# Patient Record
Sex: FEMALE | Race: BLACK OR AFRICAN AMERICAN | NOT HISPANIC OR LATINO | Employment: FULL TIME | URBAN - METROPOLITAN AREA
[De-identification: names, ages, dates, MRNs, and addresses within clinical notes are randomized per-mention and may not be internally consistent; named-entity substitution may affect disease eponyms.]

---

## 2017-07-12 ENCOUNTER — TRANSCRIBE ORDERS (OUTPATIENT)
Dept: ADMINISTRATIVE | Facility: HOSPITAL | Age: 42
End: 2017-07-12

## 2017-07-12 DIAGNOSIS — Q50.6: Primary | ICD-10-CM

## 2017-07-20 ENCOUNTER — HOSPITAL ENCOUNTER (OUTPATIENT)
Dept: GENERAL RADIOLOGY | Facility: HOSPITAL | Age: 42
Discharge: HOME | End: 2017-07-20
Attending: OBSTETRICS & GYNECOLOGY | Admitting: OBSTETRICS & GYNECOLOGY

## 2017-07-20 DIAGNOSIS — Q50.6: ICD-10-CM

## 2017-07-20 PROCEDURE — 74740 X-RAY FEMALE GENITAL TRACT: CPT

## 2017-07-20 PROCEDURE — 0 IOPAMIDOL 61 % SOLUTION: Performed by: RADIOLOGY

## 2017-07-20 RX ADMIN — IOPAMIDOL 20 ML: 612 INJECTION, SOLUTION INTRAVENOUS at 13:00

## 2017-10-19 ENCOUNTER — APPOINTMENT (OUTPATIENT)
Dept: WOMENS IMAGING | Facility: HOSPITAL | Age: 42
End: 2017-10-19

## 2017-10-19 PROCEDURE — 77067 SCR MAMMO BI INCL CAD: CPT | Performed by: RADIOLOGY

## 2019-04-12 ENCOUNTER — APPOINTMENT (OUTPATIENT)
Dept: WOMENS IMAGING | Facility: HOSPITAL | Age: 44
End: 2019-04-12

## 2019-04-12 PROCEDURE — 77067 SCR MAMMO BI INCL CAD: CPT | Performed by: RADIOLOGY

## 2021-11-11 ENCOUNTER — APPOINTMENT (OUTPATIENT)
Dept: WOMENS IMAGING | Facility: HOSPITAL | Age: 46
End: 2021-11-11

## 2021-11-11 PROCEDURE — 77063 BREAST TOMOSYNTHESIS BI: CPT | Performed by: RADIOLOGY

## 2021-11-11 PROCEDURE — 77067 SCR MAMMO BI INCL CAD: CPT | Performed by: RADIOLOGY

## 2022-08-22 ENCOUNTER — OFFICE VISIT (OUTPATIENT)
Dept: FAMILY MEDICINE CLINIC | Facility: CLINIC | Age: 47
End: 2022-08-22

## 2022-08-22 VITALS
SYSTOLIC BLOOD PRESSURE: 142 MMHG | TEMPERATURE: 98.3 F | DIASTOLIC BLOOD PRESSURE: 84 MMHG | HEIGHT: 63 IN | BODY MASS INDEX: 27.82 KG/M2 | WEIGHT: 157 LBS | OXYGEN SATURATION: 96 % | RESPIRATION RATE: 16 BRPM | HEART RATE: 86 BPM

## 2022-08-22 DIAGNOSIS — Z76.89 ENCOUNTER TO ESTABLISH CARE: Primary | ICD-10-CM

## 2022-08-22 DIAGNOSIS — M89.8X1 PAIN OF LEFT SCAPULA: ICD-10-CM

## 2022-08-22 DIAGNOSIS — R03.0 ELEVATED BLOOD-PRESSURE READING, WITHOUT DIAGNOSIS OF HYPERTENSION: ICD-10-CM

## 2022-08-22 DIAGNOSIS — M79.602 LEFT ARM PAIN: ICD-10-CM

## 2022-08-22 PROCEDURE — 93000 ELECTROCARDIOGRAM COMPLETE: CPT | Performed by: NURSE PRACTITIONER

## 2022-08-22 PROCEDURE — 99213 OFFICE O/P EST LOW 20 MIN: CPT | Performed by: NURSE PRACTITIONER

## 2022-08-22 RX ORDER — RIBOFLAVIN (VITAMIN B2) 100 MG
100 TABLET ORAL DAILY
COMMUNITY

## 2022-08-22 RX ORDER — ASPIRIN 81 MG/1
81 TABLET ORAL DAILY
COMMUNITY

## 2022-08-22 RX ORDER — METHYLPREDNISOLONE 4 MG/1
TABLET ORAL
Qty: 21 EACH | Refills: 0 | Status: SHIPPED | OUTPATIENT
Start: 2022-08-22 | End: 2022-09-15

## 2022-08-22 RX ORDER — CHLORAL HYDRATE 500 MG
CAPSULE ORAL
COMMUNITY

## 2022-08-22 RX ORDER — MELATONIN
1000 DAILY
COMMUNITY

## 2022-08-22 NOTE — PROGRESS NOTES
Patient ID: Michela Carty is a 47 y.o. female     Patient Care Team:  Head, NANCY Plascencia as PCP - General (Nurse Practitioner)  Pina Greene MD as Consulting Physician (Obstetrics and Gynecology)    Subjective     Chief Complaint   Patient presents with   • Establish Care   • Pain     Left arm and wrist    • Hypertension       History of Present Illness    Michela Carty presents to [facility] today to establish care with our practice.    Left scapula pain for one week.  No known injury.  Left arm pain, tingling and numbness for about one month.   Elevated B/P 140/93.    Initially started noticing elevated B/P about 3 years ago. Staying around 140/90 or less.    Left upper back pain at 6 on 0-10 scale.  Home remedies:  Heat, ice, and ibuprofen with temporary relief.   Works at a computer.  Right handed.      She denies any complaints of fever, chills, cough, chest pain, heart palpitations, shortness of air, abdominal pain, nausea, or any other concerns.     The following portions of the patient's history were reviewed and updated as appropriate: allergies, current medications, past family history, past medical history, past social history, past surgical history and problem list.       ROS    Vitals:    08/22/22 1358   BP: 142/84   Pulse: 86   Resp: 16   Temp: 98.3 °F (36.8 °C)   SpO2: 96%       Documented weights    08/22/22 1358   Weight: 71.2 kg (157 lb)     Body mass index is 27.81 kg/m².    Results for orders placed or performed during the hospital encounter of 02/12/21   POC Urinalysis Dipstick, Multipro (Automated dipstick)    Specimen: Urine   Result Value Ref Range    Color Yellow Yellow, Straw, Dark Yellow, Flory    Clarity, UA Cloudy (A) Clear    Glucose, UA Negative Negative, 1000 mg/dL (3+) mg/dL    Bilirubin Negative Negative    Ketones, UA Negative Negative    Specific Gravity  1.010 1.005 - 1.030    Blood, UA Moderate (A) Negative    pH, Urine 6.5 5.0 - 8.0     Protein, POC 30 mg/dL (A) Negative mg/dL    Urobilinogen, UA Normal Normal    Nitrite, UA Negative Negative    Leukocytes Large (3+) (A) Negative           Objective     Physical Exam  Vitals reviewed.   Constitutional:       General: She is not in acute distress.     Appearance: She is well-developed.   HENT:      Head: Normocephalic and atraumatic.      Right Ear: Tympanic membrane normal.      Left Ear: Tympanic membrane normal.   Eyes:      Pupils: Pupils are equal, round, and reactive to light.   Cardiovascular:      Rate and Rhythm: Normal rate and regular rhythm.      Heart sounds: Normal heart sounds. No murmur heard.  Pulmonary:      Effort: Pulmonary effort is normal.      Breath sounds: Normal breath sounds. No wheezing, rhonchi or rales.   Abdominal:      General: Bowel sounds are normal.      Palpations: Abdomen is soft.      Tenderness: There is no abdominal tenderness.   Musculoskeletal:         General: No tenderness. Normal range of motion.      Right shoulder: Normal.      Left shoulder: Normal.      Right wrist: No swelling, deformity or tenderness. Normal range of motion. Normal pulse.      Left wrist: No swelling, deformity or tenderness. Normal range of motion. Normal pulse.      Cervical back: Normal range of motion and neck supple.      Comments: Decreased strength in left hand.    Negative Phalen test.  Left wrist sleeve in place.   Tenderness to left scapula area.      Skin:     General: Skin is warm and dry.      Findings: No erythema or rash.   Neurological:      Mental Status: She is alert and oriented to person, place, and time.   Psychiatric:         Behavior: Behavior normal.         ECG 12 Lead    Date/Time: 8/22/2022 2:31 PM  Performed by: Delisa Brooks APRN  Authorized by: Delisa Brooks APRN   Previous ECG: no previous ECG available  Rhythm: sinus rhythm  Rate: normal  Conduction: conduction normal  ST Segments: ST segments normal  T Waves: T waves normal  QRS axis: normal  Other: no  other findings    Clinical impression: normal ECG             Assessment & Plan     Assessment/Plan     Diagnoses and all orders for this visit:    1. Encounter to establish care (Primary)  -     CBC (No Diff); Future  -     Comprehensive Metabolic Panel; Future  -     Lipid Panel With / Chol / HDL Ratio; Future  -     TSH Rfx On Abnormal To Free T4; Future    2. Left arm pain  -     ECG 12 Lead    3. Pain of left scapula  -     methylPREDNISolone (MEDROL) 4 MG dose pack; Take as directed on package instructions.  Dispense: 21 each; Refill: 0   Continue ibuprofen as needed.   Heating pad.    4. Elevated blood-pressure reading, without diagnosis of hypertension   Continue to monitor B/P   Let us know if stays > 140/90.   Limit salt intake.  Drink plenty of fluids.      Summary:  Michela Carty presents to office to establish care with our practice.  Main concern is left scapula and left arm pain.  EKG stable.  Will treat with medrol dose pack for now to see if helps.  Schedule for fasting lab appointment.  Results will determine further recommendations.      In the meantime, instructed to contact us sooner for any problems or concerns.    Follow Up:  Return for Schedule for fasting lab appointment and will call.  .    Patient was given instructions and counseling regarding condition or for health maintenance advice.  Please see specific information pulled into the AVS if appropriate.      Patient was wearing facemask when I entered the room and throughout our encounter. Protective equipment was worn throughout this patient encounter including a face mask.  Hand hygiene was performed before donning protective equipment and after removal when leaving the room.     Delisa Brooks, APRN  Family Medicine  [facility] [place]  08/22/22  14:40 EDT

## 2022-08-31 ENCOUNTER — TELEPHONE (OUTPATIENT)
Dept: FAMILY MEDICINE CLINIC | Facility: CLINIC | Age: 47
End: 2022-08-31

## 2022-08-31 NOTE — TELEPHONE ENCOUNTER
Caller: Michela Carty    Relationship: Self    Best call back number: [phone]      What is the best time to reach you: ANY TIME    Who are you requesting to speak with (clinical staff, provider,  specific staff member): MARIA LUISA GUTIÉRREZ      What was the call regarding: PATIENT WAS CALLING IN ABOUT HER TEST RESULTS SHE HAS SOME QUESTIONS AND CONCERNS SHE STILL FEELS THE DISCOMFORT AND DOES NOT HAVE A CARDIOLOGIST APPOINTMENT UNTIL October AND WANTS TO KNOW WHAT ELSE SHE SHOULD BE DOING    Do you require a callback: YES

## 2022-09-15 ENCOUNTER — TELEMEDICINE (OUTPATIENT)
Dept: FAMILY MEDICINE CLINIC | Facility: CLINIC | Age: 47
End: 2022-09-15

## 2022-09-15 DIAGNOSIS — M54.2 NECK PAIN: Primary | ICD-10-CM

## 2022-09-15 DIAGNOSIS — M25.512 ACUTE PAIN OF LEFT SHOULDER: ICD-10-CM

## 2022-09-15 DIAGNOSIS — R20.0 NUMBNESS AND TINGLING IN LEFT ARM: ICD-10-CM

## 2022-09-15 DIAGNOSIS — M79.632 LEFT FOREARM PAIN: ICD-10-CM

## 2022-09-15 DIAGNOSIS — R20.2 NUMBNESS AND TINGLING IN LEFT ARM: ICD-10-CM

## 2022-09-15 PROCEDURE — 99213 OFFICE O/P EST LOW 20 MIN: CPT | Performed by: NURSE PRACTITIONER

## 2022-09-15 RX ORDER — MELOXICAM 15 MG/1
15 TABLET ORAL DAILY
Qty: 30 TABLET | Refills: 2 | Status: SHIPPED | OUTPATIENT
Start: 2022-09-15

## 2022-09-15 RX ORDER — CYCLOBENZAPRINE HCL 10 MG
10 TABLET ORAL EVERY 8 HOURS PRN
Qty: 30 TABLET | Refills: 0 | Status: SHIPPED | OUTPATIENT
Start: 2022-09-15

## 2022-09-15 NOTE — PROGRESS NOTES
Michela Carty is a 47 y.o. who presents today for an E-visit with complaints of continued left arm pain and numbness.      You have chosen to receive care through a telehealth visit.  Do you consent to use a video/audio connection for your medical care today? Yes    Michela Carty was located at their residence.  NANCY Gomez was located at [facility] office    Participants:  Patient and provider    Start time:  1424   End time:  1434  Time spent caring for the patient was 5 - 10 min.    Patient ID: Michela Carty is a 47 y.o. female     Patient Care Team:  Delisa Brooks APRN as PCP - General (Nurse Practitioner)  Pina Greene MD as Consulting Physician (Obstetrics and Gynecology)    Subjective     Chief Complaint   Patient presents with   • Neck Pain   • Numbness       History of Present Illness    Michela Carty presents to [facility] today for continued problems with left arm pain and numbness.   Seen in office as a new patient on 8/22/22 due to left scapula pain and left arm pain, tingling, and numbness.      Left arm pain and numbness.  Left 5th digit stays numb and also will occur in left 4th digit.    Intermittent neck pain.  Tension in shoulders.  No pain with ROM of left shoulder and left arm except will have numbness to palm of left hand when reaching overhead.  Has pain in left forearm when resting on object such as car door.    Noticed improvement of left scapula pain with medrol dose pack however all other symptoms continue.    No known injury.    Has been taking ibuprofen 800 mg bid without improvement.       She denies any complaints of fever, chills, cough, chest pain, shortness of air, abdominal pain, nausea, or any other concerns.     The following portions of the patient's history were reviewed and updated as appropriate: allergies, current medications, past family history, past medical history, past social history, past surgical history and  problem list.       ROS    There were no vitals filed for this visit.    There were no vitals filed for this visit.  There is no height or weight on file to calculate BMI.    Results for orders placed or performed in visit on 08/23/22   CBC (No Diff)    Specimen: Blood   Result Value Ref Range    WBC 10.3 3.4 - 10.8 x10E3/uL    RBC 4.65 3.77 - 5.28 x10E6/uL    Hemoglobin 11.1 11.1 - 15.9 g/dL    Hematocrit 37.1 34.0 - 46.6 %    MCV 80 79 - 97 fL    MCH 23.9 (L) 26.6 - 33.0 pg    MCHC 29.9 (L) 31.5 - 35.7 g/dL    RDW 17.0 (H) 11.7 - 15.4 %    Platelets 316 150 - 450 x10E3/uL   Comprehensive Metabolic Panel    Specimen: Blood   Result Value Ref Range    Glucose 83 65 - 99 mg/dL    BUN 10 6 - 24 mg/dL    Creatinine 0.97 0.57 - 1.00 mg/dL    EGFR Result 73 >59 mL/min/1.73    BUN/Creatinine Ratio 10 9 - 23    Sodium 140 134 - 144 mmol/L    Potassium 4.3 3.5 - 5.2 mmol/L    Chloride 100 96 - 106 mmol/L    Total CO2 25 20 - 29 mmol/L    Calcium 9.5 8.7 - 10.2 mg/dL    Total Protein 7.1 6.0 - 8.5 g/dL    Albumin 4.6 3.8 - 4.8 g/dL    Globulin 2.5 1.5 - 4.5 g/dL    A/G Ratio 1.8 1.2 - 2.2    Total Bilirubin 0.4 0.0 - 1.2 mg/dL    Alkaline Phosphatase 59 44 - 121 IU/L    AST (SGOT) 24 0 - 40 IU/L    ALT (SGPT) 19 0 - 32 IU/L   Lipid Panel With / Chol / HDL Ratio    Specimen: Blood   Result Value Ref Range    Total Cholesterol 184 100 - 199 mg/dL    Triglycerides 84 0 - 149 mg/dL    HDL Cholesterol 51 >39 mg/dL    VLDL Cholesterol Philip 15 5 - 40 mg/dL    LDL Chol Calc (NIH) 118 (H) 0 - 99 mg/dL    Chol/HDL Ratio 3.6 0.0 - 4.4 ratio   TSH Rfx On Abnormal To Free T4    Specimen: Blood   Result Value Ref Range    TSH 3.700 0.450 - 4.500 uIU/mL   Iron Profile   Result Value Ref Range    TIBC 490 (H) 250 - 450 ug/dL    UIBC 452 (H) 131 - 425 ug/dL    Iron 38 27 - 159 ug/dL    Iron Saturation 8 (L) 15 - 55 %   Please Note   Result Value Ref Range    Please note Comment    Written Authorization   Result Value Ref Range    Written  Authorization Comment            Objective     Physical Exam  Constitutional:       General: She is not in acute distress.  Musculoskeletal:      Comments: Exam limited due to video visit.      Self directed exam.  C/O numbness in left hand when reaching overhead with left arm.   Patient points to numbness in left 5th digit.      Neurological:      Mental Status: She is alert and oriented to person, place, and time.            Assessment & Plan     Assessment/Plan     Diagnoses and all orders for this visit:    1. Neck pain (Primary)  -     meloxicam (MOBIC) 15 MG tablet; Take 1 tablet by mouth Daily.  Dispense: 30 tablet; Refill: 2  -     cyclobenzaprine (FLEXERIL) 10 MG tablet; Take 1 tablet by mouth Every 8 (Eight) Hours As Needed for Muscle Spasms.  Dispense: 30 tablet; Refill: 0  -     XR Spine Cervical 2 or 3 View; Future    2. Numbness and tingling in left arm  -     meloxicam (MOBIC) 15 MG tablet; Take 1 tablet by mouth Daily.  Dispense: 30 tablet; Refill: 2  -     XR Spine Cervical 2 or 3 View; Future  -     XR Shoulder 2+ View Left; Future  -     XR forearm 2 vw left; Future    3. Acute pain of left shoulder  -     XR Shoulder 2+ View Left; Future  -     XR forearm 2 vw left; Future    4. Left forearm pain  -     XR forearm 2 vw left; Future      Xray results will determine further recommendations.      Follow Up:  Return if symptoms worsen or fail to improve.    Patient was given instructions and counseling regarding condition or for health maintenance advice.  Please see specific information pulled into the AVS if appropriate.      Patient was wearing facemask when I entered the room and throughout our encounter. Protective equipment was worn throughout this patient encounter including a face mask.  Hand hygiene was performed before donning protective equipment and after removal when leaving the room.     Delisa Brooks, APRN  Family Medicine  [facility] [place]  09/15/22  14:37 EDT

## 2022-09-16 ENCOUNTER — HOSPITAL ENCOUNTER (OUTPATIENT)
Dept: GENERAL RADIOLOGY | Facility: HOSPITAL | Age: 47
Discharge: HOME | End: 2022-09-16

## 2022-09-16 DIAGNOSIS — M25.512 ACUTE PAIN OF LEFT SHOULDER: ICD-10-CM

## 2022-09-16 DIAGNOSIS — R20.2 NUMBNESS AND TINGLING IN LEFT ARM: ICD-10-CM

## 2022-09-16 DIAGNOSIS — R20.0 NUMBNESS AND TINGLING IN LEFT ARM: ICD-10-CM

## 2022-09-16 DIAGNOSIS — M79.632 LEFT FOREARM PAIN: ICD-10-CM

## 2022-09-16 DIAGNOSIS — M54.2 NECK PAIN: ICD-10-CM

## 2022-09-16 PROCEDURE — 73090 X-RAY EXAM OF FOREARM: CPT

## 2022-09-16 PROCEDURE — 72040 X-RAY EXAM NECK SPINE 2-3 VW: CPT

## 2022-09-16 PROCEDURE — 73030 X-RAY EXAM OF SHOULDER: CPT

## 2022-09-19 DIAGNOSIS — M54.2 NECK PAIN: Primary | ICD-10-CM

## 2022-09-19 DIAGNOSIS — R20.2 NUMBNESS AND TINGLING IN LEFT ARM: ICD-10-CM

## 2022-09-19 DIAGNOSIS — M50.30 DEGENERATIVE DISC DISEASE, CERVICAL: ICD-10-CM

## 2022-09-19 DIAGNOSIS — R20.0 NUMBNESS AND TINGLING IN LEFT ARM: ICD-10-CM

## 2022-09-27 ENCOUNTER — TREATMENT (OUTPATIENT)
Dept: PHYSICAL THERAPY | Facility: CLINIC | Age: 47
End: 2022-09-27

## 2022-09-27 DIAGNOSIS — R20.0 NUMBNESS AND TINGLING IN LEFT ARM: ICD-10-CM

## 2022-09-27 DIAGNOSIS — M54.2 PAIN, NECK: Primary | ICD-10-CM

## 2022-09-27 DIAGNOSIS — M50.30 DDD (DEGENERATIVE DISC DISEASE), CERVICAL: ICD-10-CM

## 2022-09-27 DIAGNOSIS — R20.2 NUMBNESS AND TINGLING IN LEFT ARM: ICD-10-CM

## 2022-09-27 PROCEDURE — 97161 PT EVAL LOW COMPLEX 20 MIN: CPT | Performed by: PHYSICAL THERAPIST

## 2022-09-27 PROCEDURE — 97110 THERAPEUTIC EXERCISES: CPT | Performed by: PHYSICAL THERAPIST

## 2022-09-27 PROCEDURE — 97530 THERAPEUTIC ACTIVITIES: CPT | Performed by: PHYSICAL THERAPIST

## 2022-09-27 NOTE — PROGRESS NOTES
Physical Therapy Initial Evaluation and Plan of Care    Patient: Michela Carty   : 1975  Diagnosis/ICD-10 Code:  Pain, neck [M54.2]  Referring practitioner: NANCY Varela  NPI: [id number]                                      Date of Initial Visit: 2022  Today's Date: 2022  Patient seen for 1 session         Visit Diagnoses:    ICD-10-CM ICD-9-CM   1. Pain, neck  M54.2 723.1   2. Numbness and tingling in left arm  R20.0 782.0    R20.2    3. DDD (degenerative disc disease), cervical  M50.30 722.4         Subjective Questionnaire: NDI:20%      Subjective Evaluation    History of Present Illness  Mechanism of injury: Pt reports a 6 week onset of LUE tingling/numbness.  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  Recently changed chairs and that has helped.    The numbness in 5th digit is mostly constant; tingling is also down the outside arm/FA (intermittent).  No work restrictions but does try to get up every 15 minutes.  Notices tightness in neck that increases with stress levels.      Avoids sleeping on L side or the whole arm goes numb.     - vision changes or diplopia    Pt reports that she likes to walk, rides stationary bike.  Was playing basketball and tennis but has had to stop    x-ray  IMPRESSION:  Mild to moderate degenerative disc disease at C6-7.    PMH unremarkable      Patient Occupation: computer work Pain  Current pain rating: 3  At worst pain ratin  Quality: radiating, discomfort and throbbing (feels tight)  Relieving factors: change in position  Exacerbated by: bending forward, driving.  Progression: improved    Hand dominance: right    Patient Goals  Patient goals for therapy: return to sport/leisure activities and decreased pain  Patient goal: eliminate numbness           Objective          Postural Observations  Seated posture: fair  Standing posture: fair        Tenderness   Cervical Spine   Tenderness in the left 1st rib (elevated).     Neurological  Testing     Sensation   Cervical/Thoracic   Left   Diminished: light touch  Paresthesia: light touch    Comments   Left light touch: 5th digit.     Active Range of Motion   Cervical/Thoracic Spine   Cervical    Flexion: 35 degrees   Extension: 35 degrees   Left lateral flexion: 50 degrees   Right lateral flexion: 45 degrees   Left rotation: 70 degrees   Right rotation: 70 degrees     Strength/Myotome Testing     Left Shoulder     Planes of Motion   Flexion: 4   Abduction: 4   External rotation at 0°: 5     Right Shoulder     Planes of Motion   Flexion: 5   Abduction: 5   External rotation at 0°: 5     Left Elbow   Flexion: 5  Extension: 4+    Right Elbow   Flexion: 5  Extension: 5    Left Wrist/Hand   Wrist extension: 5  Wrist flexion: 5    Right Wrist/Hand   Wrist extension: 5  Wrist flexion: 5    Tests   Cervical     Left   Positive ULTT4.   Negative Adson maneuver, active compression (Pratt), cervical distraction and Spurling's sign.     Additional Tests Details  - vertebra a  Improved symptoms with pec minor stretch            Assessment & Plan     Assessment  Impairments: abnormal or restricted ROM, activity intolerance, impaired physical strength, lacks appropriate home exercise program and pain with function  Functional Limitations: sleeping, uncomfortable because of pain and sitting  Assessment details: Michela Carty is a 47 y.o. female referred to physical therapy for neck pain with N/T into LUE. She presents with decreased cervical AROM with radiculopathy, elevated/tender 1st rib, forward head/rounded shoulders and concordant symptoms most apparent with sitting at work station/keyboarding, sleeping on L side, mild L tricep weakness and has stopped playing basketball/tennis.  She does not have any comorbidities should affect her progress in the plan of care.  Pt would benefit from skilled PT services in order to address listed impairments and increase tolerance to normal daily activities including ADLs,  work and recreational activities.  During evaluation, pt educated on anatomy, goal of interventions, posture, and body mechanics to promote healthy lifestyle and improve quality of life.      Prognosis: good    Goals  Plan Goals: STG In 3-4 weeks  1. Pt to be independent/compliant with HEP without exacerbation of symptoms   2. Pt to exhibit increased cervical flex to at least 45 to allow her to read for at least 10 minutes with tolerable symptoms  3. Pt to report at least 50% reduction in radicular complaints into LUE to allow for improved work/sitting tolerance.  4. Pt to report decreased pain with ADL's not > 6/10  5.  Desk set up with proper ergonomic positioning.    LTG In 6 weeks  1. Pt to exhibit 80 degrees of cervical B rotn to allow for viewing traffic without  limitations and tolerable symptoms.  2. Pt to demonstrate good postural awareness to allow for greater tolerance to prolonged sitting > 30 minutes as needed for her job  3. Patient is able to sleep on her L side without radicular complaints  4. Pt able to perform ADL's and recreational activities without pain > 3/10   5. Continue/resume yardwork, recreational and home activities without   increased symptoms.  6. NDI > 15% to demonstrate improved functional tolerance to ADLs           Plan  Therapy options: will be seen for skilled therapy services  Planned modality interventions: traction, ultrasound, dry needling, cryotherapy, thermotherapy (hydrocollator packs) and TENS  Planned therapy interventions: therapeutic activities, stretching, strengthening, home exercise program, manual therapy, postural training and neuromuscular re-education  Frequency: 2x week  Duration in weeks: 6  Treatment plan discussed with: patient  Plan details: Access Code: [email or web address]  URL: [email or web address]  Date: 09/27/2022  Prepared by: Sunita Patterson    Exercises  First Rib Mobilization with Strap - 2 x daily - 7 x weekly - 1 sets - 5 reps - 10 hold  Seated  Cervical Retraction - 2 x daily - 7 x weekly - 1 sets - 10 reps - 5 hold  Seated Scapular Retraction - 2 x daily - 7 x weekly - 1 sets - 10 reps - 5 hold  Shoulder External Rotation and Scapular Retraction - 2 x daily - 7 x weekly - 1 sets - 10 reps - 5 hold  Doorway Pec Stretch at 60 Elevation - 2 x daily - 7 x weekly - 1 sets - 3 reps - 20 hold    Patient was educated on findings of evaluation, purpose of treatment, and goals for therapy.  Treatment options discussed and questions answered.  Patient was educated on exercises/self treatment/pain relief techniques.            History # of Personal Factors and/or Comorbidities: LOW (0)  Examination of Body System(s): # of elements: LOW (1-2)  Clinical Presentation: EVOLVING  Clinical Decision Making: MODERATE      Timed:         Manual Therapy:    -     mins  21245;     Therapeutic Exercise:    15     mins  94272;     Neuromuscular Steve:    -    mins  20412;    Therapeutic Activity:     8     mins  08339;     Gait Training:      -     mins  57966;     Ultrasound:     -     mins  78340;    Ionto                               -    mins   51488  Self Care                       -     mins   87678    Un-Timed:  Electrical Stimulation:    -     mins  17052 ( );  Dry Needling     -     mins self-pay  Traction     -     mins 55396  Low Eval     19     Mins  92428  Mod Eval     -     Mins  23705  High Eval                       -     Mins  42948    Timed Treatment:   23   mins   Total Treatment:     42   mins    PT: Sunita Patterson PT     [place] License # [id number]    Electronically signed by Sunita Patterson PT, 9/28/22, 5:16pm EST    Certification Period: 9/28/2022 thru 12/26/2022  I certify that the therapy services are furnished while this patient is under my care.  The services outlined above are required by this patient, and will be reviewed every 90 days.         Physician Signature:__________________________________________________    PHYSICIAN: Delisa Brooks  APRN      DATE:     Please sign and return via fax to [phone]. Thank you, [facility].

## 2022-10-03 ENCOUNTER — TREATMENT (OUTPATIENT)
Dept: PHYSICAL THERAPY | Facility: CLINIC | Age: 47
End: 2022-10-03

## 2022-10-03 DIAGNOSIS — M50.30 DDD (DEGENERATIVE DISC DISEASE), CERVICAL: ICD-10-CM

## 2022-10-03 DIAGNOSIS — R20.2 NUMBNESS AND TINGLING IN LEFT ARM: ICD-10-CM

## 2022-10-03 DIAGNOSIS — R20.0 NUMBNESS AND TINGLING IN LEFT ARM: ICD-10-CM

## 2022-10-03 DIAGNOSIS — M54.2 PAIN, NECK: Primary | ICD-10-CM

## 2022-10-03 PROCEDURE — 97110 THERAPEUTIC EXERCISES: CPT | Performed by: PHYSICAL THERAPIST

## 2022-10-03 PROCEDURE — 97530 THERAPEUTIC ACTIVITIES: CPT | Performed by: PHYSICAL THERAPIST

## 2022-10-03 NOTE — PROGRESS NOTES
Physical Therapy Daily Treatment Note      Patient: Michela Carty   : 1975  Referring practitioner: NANCY Varela  Date of Initial Visit: Type: THERAPY  Noted: 2022  Today's Date: 10/3/2022  Patient seen for 2 sessions       Visit Diagnoses:    ICD-10-CM ICD-9-CM   1. Pain, neck  M54.2 723.1   2. Numbness and tingling in left arm  R20.0 782.0    R20.2    3. DDD (degenerative disc disease), cervical  M50.30 722.4       Subjective   Michela Carty reports: tingling in L 5th digit is no longer constant; over all feeling better    Objective   See Exercise, Manual, and Modality Logs for complete treatment.   Level 1st ribs  Mild tenderness L C3-6 facets    Assessment/Plan   Pt presents with improved L 1st rib position and decreased radicular complaints.  She demonstrates good understanding/form with HEP.  Pt was issued updated HEP printout to facilitate compliance and recall with ex progressions today. Pt was educated on work station setup with printouts.   Progress per Plan of Care   Test triceps and advance TB  Probable d/c next visit if symptoms continue to decrease.           Timed:         Manual Therapy:    -     mins  94826;     Therapeutic Exercise:    13     mins  82191;     Neuromuscular Steve:    -    mins  66248;    Therapeutic Activity:     8     mins  44899;     Gait Training:      -     mins  86089;     Ultrasound:     -     mins  82383;    Ionto                               -    mins   54855  Self Care                       -     mins   02990      Timed Treatment:   21   mins   Total Treatment:     21   mins    MADELINE Hackett License: #[id number]

## 2022-10-11 ENCOUNTER — TREATMENT (OUTPATIENT)
Dept: PHYSICAL THERAPY | Facility: CLINIC | Age: 47
End: 2022-10-11

## 2022-10-11 DIAGNOSIS — M50.30 DDD (DEGENERATIVE DISC DISEASE), CERVICAL: ICD-10-CM

## 2022-10-11 DIAGNOSIS — R20.2 NUMBNESS AND TINGLING IN LEFT ARM: ICD-10-CM

## 2022-10-11 DIAGNOSIS — R20.0 NUMBNESS AND TINGLING IN LEFT ARM: ICD-10-CM

## 2022-10-11 DIAGNOSIS — M54.2 PAIN, NECK: Primary | ICD-10-CM

## 2022-10-11 PROCEDURE — 97110 THERAPEUTIC EXERCISES: CPT | Performed by: PHYSICAL THERAPIST

## 2022-10-11 PROCEDURE — 97530 THERAPEUTIC ACTIVITIES: CPT | Performed by: PHYSICAL THERAPIST

## 2022-10-11 NOTE — PROGRESS NOTES
Physical Therapy Daily Treatment Note      Patient: Michela Carty   : 1975  Referring practitioner: NANCY Varela  Date of Initial Visit: Type: THERAPY  Noted: 2022  Today's Date: 10/11/2022  Patient seen for 3 sessions       Visit Diagnoses:    ICD-10-CM ICD-9-CM   1. Pain, neck  M54.2 723.1   2. Numbness and tingling in left arm  R20.0 782.0    R20.2    3. DDD (degenerative disc disease), cervical  M50.30 722.4       Subjective   Michela Carty reports: noticed some tingling into L  Hand after 15 min working on computer.  Is able to lay on her L side with minimal symptoms and can now sleep on stomach.  Significant reduction in nighttime radicular complaints.      Pain Rating (0-10): 0-2/10    Objective          Tenderness   Cervical Spine   Tenderness in the left 1st rib (elevated).     Active Range of Motion   Cervical/Thoracic Spine   Cervical    Flexion: 50 degrees   Left rotation: 81 degrees   Right rotation: 81 degrees     Strength/Myotome Testing     Left Shoulder     Planes of Motion   Flexion: 5   Abduction: 5   External rotation at 0°: 5     Left Elbow   Flexion: 5  Extension: 5    Left Wrist/Hand   Wrist extension: 5  Wrist flexion: 5      See Exercise, Manual, and Modality Logs for complete treatment.       Assessment/Plan   Pt is making steady progress toward goals with improved C-S AROM/LUE strength, decreasing radicular complaints and is in the midst of making ergonomic changes to her workstation.  Pt was issued updated HEP printout to facilitate compliance and recall with ex progressions today.  Progress per Plan of Care  Will keep next appt in case symptoms persist; follow up if needed.     Timed:         Manual Therapy:    2     mins  11387;     Therapeutic Exercise:    26     mins  82989;     Neuromuscular Steve:    -    mins  72832;    Therapeutic Activity:     11     mins  89020;     Gait Training:      -     mins  46988;     Ultrasound:     -     mins  95459;    Ionto                                -    mins   55855  Self Care                       -     mins   39213    Timed Treatment:   39   mins   Total Treatment:     39   mins    Sunita Patterson, PT  [place] License: #[id number]

## 2022-10-18 ENCOUNTER — TELEPHONE (OUTPATIENT)
Dept: PHYSICAL THERAPY | Facility: OTHER | Age: 47
End: 2022-10-18

## 2022-10-25 ENCOUNTER — TELEPHONE (OUTPATIENT)
Dept: PHYSICAL THERAPY | Facility: CLINIC | Age: 47
End: 2022-10-25

## 2022-11-03 ENCOUNTER — OFFICE VISIT (OUTPATIENT)
Dept: CARDIOLOGY | Facility: CLINIC | Age: 47
End: 2022-11-03

## 2022-11-03 VITALS
WEIGHT: 146.8 LBS | HEART RATE: 76 BPM | BODY MASS INDEX: 26.01 KG/M2 | OXYGEN SATURATION: 99 % | SYSTOLIC BLOOD PRESSURE: 140 MMHG | HEIGHT: 63 IN | DIASTOLIC BLOOD PRESSURE: 80 MMHG

## 2022-11-03 DIAGNOSIS — R03.0 ELEVATED BP WITHOUT DIAGNOSIS OF HYPERTENSION: Primary | ICD-10-CM

## 2022-11-03 DIAGNOSIS — M50.30 DEGENERATIVE DISC DISEASE, CERVICAL: ICD-10-CM

## 2022-11-03 PROCEDURE — 93000 ELECTROCARDIOGRAM COMPLETE: CPT | Performed by: INTERNAL MEDICINE

## 2022-11-03 PROCEDURE — 99203 OFFICE O/P NEW LOW 30 MIN: CPT | Performed by: INTERNAL MEDICINE

## 2022-11-03 NOTE — PROGRESS NOTES
Subjective:     Encounter Date:2022      Patient ID: Michela Carty is a 47 y.o. female.    Chief Complaint:  History of Present Illness    This is a 47-year-old with recently diagnosed degenerative cervical disc disease, who presents for cardiac evaluation.    The patient developed issues with left arm shoulder and arm numbness and discomfort.  This is associated with pain and her left upper back.  She was concerned this may be a cardiac issue in light of a family history.  At that time she scheduled an evaluation with us.  In the meanwhile she was diagnosed with degenerative cervicals disc disease.  She subsequently was referred to physical therapy and with that her symptoms have essentially resolved.  She continues to follow the advice in regards to changing certain habits and the way she sits at work which also seems to help with her symptoms.  She is now able to lay on her left side without any significant issues which she was unable to do so before.    She otherwise denies any chest discomfort, palpitations, shortness of breath, orthopnea, near-syncope or syncope, or lower extremity edema.  She exercises on a regular basis any significant issues.  She reports a family history of a stroke in her father and heart disease in her paternal uncle who  of a heart attack at the age of 49.    She has noted that her blood pressures of been running a little bit high.  She especially noted this while she was suffering with her left arm pain.  Has improved some but her systolics are still running on the 140s and sometimes 150s.  She reports that her brother recently was started on medications for hypertension.    Review of Systems   Constitutional: Negative for malaise/fatigue.   HENT: Negative for hearing loss, hoarse voice, nosebleeds and sore throat.    Eyes: Negative for pain.   Cardiovascular: Negative for chest pain, claudication, cyanosis, dyspnea on exertion, irregular heartbeat, leg swelling,  near-syncope, orthopnea, palpitations, paroxysmal nocturnal dyspnea and syncope.   Respiratory: Negative for shortness of breath and snoring.    Endocrine: Negative for cold intolerance, heat intolerance, polydipsia, polyphagia and polyuria.   Skin: Negative for itching and rash.   Musculoskeletal: Negative for arthritis, falls, joint pain, joint swelling, muscle cramps, muscle weakness and myalgias.   Gastrointestinal: Negative for constipation, diarrhea, dysphagia, heartburn, hematemesis, hematochezia, melena, nausea and vomiting.   Genitourinary: Negative for frequency, hematuria and hesitancy.   Neurological: Negative for excessive daytime sleepiness, dizziness, headaches, light-headedness, numbness and weakness.   Psychiatric/Behavioral: Negative for depression. The patient is not nervous/anxious.          Current Outpatient Medications:   •  ascorbic acid (VITAMIN C) 100 MG tablet, Take 100 mg by mouth Daily., Disp: , Rfl:   •  aspirin 81 MG EC tablet, Take 81 mg by mouth Daily., Disp: , Rfl:   •  cholecalciferol (VITAMIN D3) 25 MCG (1000 UT) tablet, Take 1,000 Units by mouth Daily., Disp: , Rfl:   •  cyclobenzaprine (FLEXERIL) 10 MG tablet, Take 1 tablet by mouth Every 8 (Eight) Hours As Needed for Muscle Spasms., Disp: 30 tablet, Rfl: 0  •  meloxicam (MOBIC) 15 MG tablet, Take 1 tablet by mouth Daily., Disp: 30 tablet, Rfl: 2  •  Multiple Vitamins-Minerals (AIRBORNE PO), Take  by mouth., Disp: , Rfl:   •  Omega-3 Fatty Acids (fish oil) 1000 MG capsule capsule, Take  by mouth Daily With Breakfast., Disp: , Rfl:   •  Specialty Vitamins Products (ONE-A-DAY CHOLESTEROL PO), Take  by mouth., Disp: , Rfl:   •  Cyanocobalamin (VITAMIN B 12 PO), Take  by mouth., Disp: , Rfl:     Past Medical History:   Diagnosis Date   • Allergic    • Allergies    • Anemia    • DDD (degenerative disc disease), cervical    • Dysmenorrhea    • Fibroids    • Left shoulder pain    • Neck pain    • Urinary tract infection        Past  "Surgical History:   Procedure Laterality Date   • D & C HYSTEROSCOPY N/A 12/14/2016    Procedure: DILATATION AND CURETTAGE HYSTEROSCOPY WITH MYOSURE;  Surgeon: Pina Greene MD;  Location: [facility] OR [facility] [place];  Service:    • WISDOM TOOTH EXTRACTION         Family History   Problem Relation Age of Onset   • Diabetes Mother    • Anemia Mother    • Heart disease Father    • Heart attack Paternal Uncle    • Hypertension Brother        Social History     Tobacco Use   • Smoking status: Never   • Smokeless tobacco: Never   Vaping Use   • Vaping Use: Never used   Substance Use Topics   • Alcohol use: Yes     Alcohol/week: 2.0 standard drinks     Types: 1 Glasses of wine, 1 Drinks containing 0.5 oz of alcohol per week     Comment: occ   • Drug use: No         ECG 12 Lead    Date/Time: 11/3/2022 12:54 PM  Performed by: Alondra Cam MD  Authorized by: Alondra Cam MD   Comparison: compared with previous ECG   Similar to previous ECG  Rhythm: sinus rhythm               Objective:     Visit Vitals  /80 (BP Location: Right arm, Patient Position: Sitting, Cuff Size: Adult)   Pulse 76   Ht 160 cm (63\")   Wt 66.6 kg (146 lb 12.8 oz)   SpO2 99%   BMI 26.00 kg/m²         Constitutional:       Appearance: Normal appearance. Well-developed.   Eyes:      General: Lids are normal.      Conjunctiva/sclera: Conjunctivae normal.      Pupils: Pupils are equal, round, and reactive to light.   HENT:      Head: Normocephalic and atraumatic.   Neck:      Vascular: No carotid bruit or JVD.      Lymphadenopathy: No cervical adenopathy.   Pulmonary:      Effort: Pulmonary effort is normal.      Breath sounds: Normal breath sounds.   Cardiovascular:      Normal rate. Regular rhythm.      No gallop.   Pulses:     Radial: 2+ bilaterally.  Edema:     Peripheral edema absent.   Abdominal:      Palpations: Abdomen is soft.   Musculoskeletal:      Cervical back: Full passive range of motion without pain, normal range of motion and neck supple. " Skin:     General: Skin is warm and dry.   Neurological:      Mental Status: Alert and oriented to person, place, and time.             Assessment:          Diagnosis Plan   1. Elevated BP without diagnosis of hypertension  ECG 12 Lead      2. Degenerative disc disease, cervical               Plan:       1.  Elevated blood pressure.  Appears to be stage II hypertension.  I think at this point it is reasonable to start with lifestyle changes to see if her pressures improve before initiating any therapy.  Discussed lifestyle changes including diet and exercise.  2.  Cervical degenerative disc disease.  Appears to be responsible for her left arm discomfort.  Fortunately this has improved with physical therapy.    We will plan on seeing the patient back again in 6 months.

## 2022-11-17 ENCOUNTER — APPOINTMENT (OUTPATIENT)
Dept: WOMENS IMAGING | Facility: HOSPITAL | Age: 47
End: 2022-11-17

## 2022-11-17 PROCEDURE — 77063 BREAST TOMOSYNTHESIS BI: CPT | Performed by: RADIOLOGY

## 2022-11-17 PROCEDURE — 77067 SCR MAMMO BI INCL CAD: CPT | Performed by: RADIOLOGY

## 2022-12-15 ENCOUNTER — TRANSCRIBE ORDERS (OUTPATIENT)
Dept: ADMINISTRATIVE | Facility: HOSPITAL | Age: 47
End: 2022-12-15

## 2022-12-15 DIAGNOSIS — D25.9 UTERINE LEIOMYOMA, UNSPECIFIED LOCATION: Primary | ICD-10-CM

## 2025-03-13 ENCOUNTER — TRANSCRIBE ORDERS (OUTPATIENT)
Dept: ADMINISTRATIVE | Facility: HOSPITAL | Age: 50
End: 2025-03-13
Payer: COMMERCIAL

## 2025-03-20 ENCOUNTER — TRANSCRIBE ORDERS (OUTPATIENT)
Dept: ADMINISTRATIVE | Facility: HOSPITAL | Age: 50
End: 2025-03-20
Payer: COMMERCIAL